# Patient Record
Sex: MALE | Race: WHITE | URBAN - METROPOLITAN AREA
[De-identification: names, ages, dates, MRNs, and addresses within clinical notes are randomized per-mention and may not be internally consistent; named-entity substitution may affect disease eponyms.]

---

## 2017-04-25 ENCOUNTER — OFFICE VISIT (OUTPATIENT)
Dept: DERMATOLOGY | Facility: CLINIC | Age: 22
End: 2017-04-25
Payer: COMMERCIAL

## 2017-04-25 DIAGNOSIS — L53.9 ERYTHRODERMA: ICD-10-CM

## 2017-04-25 DIAGNOSIS — L20.84 INTRINSIC ATOPIC DERMATITIS: Primary | ICD-10-CM

## 2017-04-25 PROCEDURE — 99214 OFFICE O/P EST MOD 30 MIN: CPT | Performed by: DERMATOLOGY

## 2017-04-25 RX ORDER — PREDNISONE 10 MG/1
TABLET ORAL
Qty: 60 TABLET | Refills: 0 | Status: SHIPPED | OUTPATIENT
Start: 2017-04-25 | End: 2018-09-26

## 2017-04-25 RX ORDER — TRIAMCINOLONE ACETONIDE 1 MG/G
OINTMENT TOPICAL
Qty: 454 G | Refills: 1 | Status: SHIPPED | OUTPATIENT
Start: 2017-04-25 | End: 2019-02-07

## 2017-04-25 NOTE — PROGRESS NOTES
[facility] Dermatology Note      Dermatology Problem List:  1. Intrinsic atopic dermatitis with flare approaching erythroderma but not as inflamed.  -Prednisone taper over 20 days from 40mg.  -Cerave + triamcinolone 0.1% ointment BID + wet wraps PM,     Encounter Date: Apr 25, 2017    CC:  Chief Complaint   Patient presents with     Derm Problem         History of Present Illness:  Mr. Joaquim Mesa is a 21 year old male who presents for evaluation of rash. He was last seen 01/06/2015 by Dr. Varghese when started on triamcinolone cream for eczema. Today, the pt reports he recently came back from [place] and the skin on his b/l elbows, neck, and legs were really dry and painful which impeded his sleep. Pt currently uses CeraVe 2-3x a day. Rash is more painful than pruritic. His worse areas are his dorsal hands and feet. The pt reports he experienced a sunburn the first day in [place]. He hasn't taken any new medication or any changes in the dose of medication. The pt is otherwise feeling well with no additional skin concerns.     Past Medical History:   Patient Active Problem List   Diagnosis     Contact dermatitis and other eczema, due to unspecified cause     Benign neoplasm of skin of trunk, except scrotum     Anal fissure     Congenital pes planus     Ingrowing nail     Patellar tendonitis     Knee pain     Family history of melanoma     Past Medical History:   Diagnosis Date     NO ACTIVE PROBLEMS      Past Surgical History:   Procedure Laterality Date     NO HISTORY OF SURGERY         Social History:  Reviewed and unchanged but kept in chart for clinician convenience  The patient is going to school for [employer] engineering.     Family History:  Reviewed and unchanged but kept in chart for clinician convenience  There is a family history of melanoma in the patient's grandparents.    Medications:  No current outpatient prescriptions on file.       Allergies   Allergen Reactions     No Known  Drug Allergies        Review of Systems:  -Constitutional: The patient is otherwise feeling well.   -Skin: As above in HPI. No additional skin concerns. No other lesions that are spontaneously bleeding, ulcerating, or not healing.    Physical exam:  Vitals: There were no vitals taken for this visit.  GEN: This is a well-nourished, well developed malein no acute distress  NEURO: Alert and oriented  PSYCH: in a pleasant mood, appropriate affect  SKIN: Total skin excluding the undergarment areas was performed. The exam included the head/face, neck, both arms, chest, back, abdomen, both legs, digits and/or nails.   -Xerosis and light pink finely scaling papules and plaques encompassing large areas of the body in an almost confluent basis without erosion or ulceration. Chest, abdomen, back, arms, legs, and face.  -B/l dorsal hands and feet with thicker somewhat lichenified red plaques.  -No other lesions of concern on areas examined.       Impression/Plan:  1. Intrinsic atopic dermatitis with flare approaching erythroderma possibly caused by sun burn or drastic changed in ambient conditions from [place] back to [place].    Prednisone taper: 40mg x 5 days, 30mg x 5 days, 20mg x 5 days, 10mg x 5 days.    Topical steroid + emollients with wetwraps at night for a week.    Cerave cream + triamcinolone 0.1% ointment BID. Deferred treating face with steroids.    Wet wraps/Wet PJ: Apply emollient and steroid, then put on damp warm long sleeve PJs and then a dry set of J's over that. Allow it to sit for at least an hour. Just do at night for a week.     Minimize soap use.      Follow-up in 2-3 weeks, earlier for new or changing lesions.       Staff Involved:  Scribe/Staff      Scribe Disclosure:   I, Kye Peres, am serving as a scribe to document services personally performed by Dr. Chriss Ram, based on data collection and the provider's statements to me.     Provider Disclosure:   I have reviewed the documentation recorded by  the scribe and have edited it as needed. I have personally performed the services documented here and the documentation accurately represents those services and the decisions made by me.     Chriss Ram MD, MS    Department of Dermatology  [facility]: Phone: [phone], Fax:[phone]  [facility]: Phone: [phone], Fax: [phone]

## 2017-04-25 NOTE — LETTER
4/25/2017       RE: Joaquim Mesa  [address] [address] [place] [id number]     Dear Colleague,    Thank you for referring your patient, Joaquim Mesa, to the [facility]. Please see a copy of my visit note below.    [facility] Dermatology Note      Dermatology Problem List:  1. Intrinsic atopic dermatitis with flare approaching erythroderma but not as inflamed.  -Prednisone taper over 20 days from 40mg.  -Cerave + triamcinolone 0.1% ointment BID + wet wraps PM,     Encounter Date: Apr 25, 2017    CC:  Chief Complaint   Patient presents with     Derm Problem         History of Present Illness:  Mr. Joaquim Mesa is a 21 year old male who presents for evaluation of rash. He was last seen 01/06/2015 by Dr. Varghese when started on triamcinolone cream for eczema. Today, the pt reports he recently came back from [place] and the skin on his b/l elbows, neck, and legs were really dry and painful which impeded his sleep. Pt currently uses CeraVe 2-3x a day. Rash is more painful than pruritic. His worse areas are his dorsal hands and feet. The pt reports he experienced a sunburn the first day in [place]. He hasn't taken any new medication or any changes in the dose of medication. The pt is otherwise feeling well with no additional skin concerns.     Past Medical History:   Patient Active Problem List   Diagnosis     Contact dermatitis and other eczema, due to unspecified cause     Benign neoplasm of skin of trunk, except scrotum     Anal fissure     Congenital pes planus     Ingrowing nail     Patellar tendonitis     Knee pain     Family history of melanoma     Past Medical History:   Diagnosis Date     NO ACTIVE PROBLEMS      Past Surgical History:   Procedure Laterality Date     NO HISTORY OF SURGERY         Social History:  Reviewed and unchanged but kept in chart for clinician convenience  The patient is going to school for  [email or web address] engineering.     Family History:  Reviewed and unchanged but kept in chart for clinician convenience  There is a family history of melanoma in the patient's grandparents.    Medications:  No current outpatient prescriptions on file.       Allergies   Allergen Reactions     No Known Drug Allergies        Review of Systems:  -Constitutional: The patient is otherwise feeling well.   -Skin: As above in HPI. No additional skin concerns. No other lesions that are spontaneously bleeding, ulcerating, or not healing.    Physical exam:  Vitals: There were no vitals taken for this visit.  GEN: This is a well-nourished, well developed malein no acute distress  NEURO: Alert and oriented  PSYCH: in a pleasant mood, appropriate affect  SKIN: Total skin excluding the undergarment areas was performed. The exam included the head/face, neck, both arms, chest, back, abdomen, both legs, digits and/or nails.   -Xerosis and light pink finely scaling papules and plaques encompassing large areas of the body in an almost confluent basis without erosion or ulceration. Chest, abdomen, back, arms, legs, and face.  -B/l dorsal hands and feet with thicker somewhat lichenified red plaques.  -No other lesions of concern on areas examined.       Impression/Plan:  1. Intrinsic atopic dermatitis with flare approaching erythroderma possibly caused by sun burn or drastic changed in ambient conditions from [place] back to [place].    Prednisone taper: 40mg x 5 days, 30mg x 5 days, 20mg x 5 days, 10mg x 5 days.    Topical steroid + emollients with wetwraps at night for a week.    Cerave cream + triamcinolone 0.1% ointment BID. Deferred treating face with steroids.    Wet wraps/Wet PJ: Apply emollient and steroid, then put on damp warm long sleeve PJs and then a dry set of J's over that. Allow it to sit for at least an hour. Just do at night for a week.     Minimize soap use.      Follow-up in 2-3 weeks, earlier for new or changing lesions.        Staff Involved:  Scribe/Staff      Scribe Disclosure:   I, Kye Peres, am serving as a scribe to document services personally performed by Dr. Chriss Ram, based on data collection and the provider's statements to me.     Provider Disclosure:   I have reviewed the documentation recorded by the scribe and have edited it as needed. I have personally performed the services documented here and the documentation accurately represents those services and the decisions made by me.     Chriss Ram MD, MS    Department of Dermatology  [facility]: Phone: [phone], Fax:[phone]  [facility]: Phone: [phone], Fax: [phone]

## 2017-04-25 NOTE — LETTER
4/25/2017      RE: Joaquim Mesa  [address] [address] [place] [id number]       [facility] Dermatology Note      Dermatology Problem List:  1. Intrinsic atopic dermatitis with flare approaching erythroderma but not as inflamed.  -Prednisone taper over 20 days from 40mg.  -Cerave + triamcinolone 0.1% ointment BID + wet wraps PM,     Encounter Date: Apr 25, 2017    CC:  Chief Complaint   Patient presents with     Derm Problem         History of Present Illness:  Mr. Joaquim Mesa is a 21 year old male who presents for evaluation of rash. He was last seen 01/06/2015 by Dr. Varghese when started on triamcinolone cream for eczema. Today, the pt reports he recently came back from [place] and the skin on his b/l elbows, neck, and legs were really dry and painful which impeded his sleep. Pt currently uses CeraVe 2-3x a day. Rash is more painful than pruritic. His worse areas are his dorsal hands and feet. The pt reports he experienced a sunburn the first day in [place]. He hasn't taken any new medication or any changes in the dose of medication. The pt is otherwise feeling well with no additional skin concerns.     Past Medical History:   Patient Active Problem List   Diagnosis     Contact dermatitis and other eczema, due to unspecified cause     Benign neoplasm of skin of trunk, except scrotum     Anal fissure     Congenital pes planus     Ingrowing nail     Patellar tendonitis     Knee pain     Family history of melanoma     Past Medical History:   Diagnosis Date     NO ACTIVE PROBLEMS      Past Surgical History:   Procedure Laterality Date     NO HISTORY OF SURGERY         Social History:  Reviewed and unchanged but kept in chart for clinician convenience  The patient is going to school for [employer] engineering.     Family History:  Reviewed and unchanged but kept in chart for clinician convenience  There is a family history of melanoma in the patient's grandparents.    Medications:  No current  outpatient prescriptions on file.       Allergies   Allergen Reactions     No Known Drug Allergies        Review of Systems:  -Constitutional: The patient is otherwise feeling well.   -Skin: As above in HPI. No additional skin concerns. No other lesions that are spontaneously bleeding, ulcerating, or not healing.    Physical exam:  Vitals: There were no vitals taken for this visit.  GEN: This is a well-nourished, well developed malein no acute distress  NEURO: Alert and oriented  PSYCH: in a pleasant mood, appropriate affect  SKIN: Total skin excluding the undergarment areas was performed. The exam included the head/face, neck, both arms, chest, back, abdomen, both legs, digits and/or nails.   -Xerosis and light pink finely scaling papules and plaques encompassing large areas of the body in an almost confluent basis without erosion or ulceration. Chest, abdomen, back, arms, legs, and face.  -B/l dorsal hands and feet with thicker somewhat lichenified red plaques.  -No other lesions of concern on areas examined.       Impression/Plan:  1. Intrinsic atopic dermatitis with flare approaching erythroderma possibly caused by sun burn or drastic changed in ambient conditions from [place] back to [place].    Prednisone taper: 40mg x 5 days, 30mg x 5 days, 20mg x 5 days, 10mg x 5 days.    Topical steroid + emollients with wetwraps at night for a week.    Cerave cream + triamcinolone 0.1% ointment BID. Deferred treating face with steroids.    Wet wraps/Wet PJ: Apply emollient and steroid, then put on damp warm long sleeve PJs and then a dry set of J's over that. Allow it to sit for at least an hour. Just do at night for a week.     Minimize soap use.      Follow-up in 2-3 weeks, earlier for new or changing lesions.       Staff Involved:  Scribe/Staff      Scribe Disclosure:   Kye ZUÑIGA, am serving as a scribe to document services personally performed by Dr. Chriss Ram, based on data collection and the provider's  statements to me.     Provider Disclosure:   I have reviewed the documentation recorded by the scribe and have edited it as needed. I have personally performed the services documented here and the documentation accurately represents those services and the decisions made by me.     Chriss Ram MD, MS    Department of Dermatology  [facility]: Phone: [phone], Fax:[phone]  [facility]: Phone: [phone], Fax: [phone]

## 2017-04-25 NOTE — PATIENT INSTRUCTIONS
Treating eczema:  Systemic: Prednisone taper for 20 days.    Topical: Wet wrap procedure for next 3-5 days.  Once a day at night, after bathing, apply moisturize and topical steroid.  Then put on a set of long sleeve PJs dampened with warm water.  Then put on a set of dry PJs over that.  Sit in this for at least 1 hour. Better if longer. Just watch TV or something while doing this.  This will drive the medication in better.

## 2017-04-25 NOTE — MR AVS SNAPSHOT
After Visit Summary   4/25/2017    Joaquim Mesa    MRN: [id number]           Patient Information     Date Of Birth          1995        Visit Information        Provider Department      4/25/2017 9:45 AM Chriss Ram MD [facility]        Today's Diagnoses     Intrinsic atopic dermatitis    -  1    Erythroderma          Care Instructions    Treating eczema:  Systemic: Prednisone taper for 20 days.    Topical: Wet wrap procedure for next 3-5 days.  Once a day at night, after bathing, apply moisturize and topical steroid.  Then put on a set of long sleeve PJs dampened with warm water.  Then put on a set of dry PJs over that.  Sit in this for at least 1 hour. Better if longer. Just watch TV or something while doing this.  This will drive the medication in better.          Follow-ups after your visit        Your next 10 appointments already scheduled     May 23, 2017  9:15 AM CDT   Return Visit with Chriss Ram MD   [facility] ([facility])    [address] [place] [id number] [phone]              Who to contact     If you have questions or need follow up information about today's clinic visit or your schedule please contact [facility] directly at [phone].  Normal or non-critical lab and imaging results will be communicated to you by [employer], letter or phone within 4 business days after the clinic has received the results. If you do not hear from us within 7 days, please contact the clinic through [employer] or phone. If you have a critical or abnormal lab result, we will notify you by phone as soon as possible.  Submit refill requests through [employer] or call your pharmacy and they will forward the refill request to us. Please allow 3 business days for your refill to be completed.          Additional Information About Your Visit        [employer] Information     [employer] is an electronic gateway  that provides easy, online access to your medical records. With [employer], you can request a clinic appointment, read your test results, renew a prescription or communicate with your care team.     To sign up for [employer] visit the website at [email or web address]   You will be asked to enter the access code listed below, as well as some personal information. Please follow the directions to create your username and password.     Your access code is: [email or web address]  Expires: 2017 10:14 AM     Your access code will  in 90 days. If you need help or a new code, please contact your [facility] or call [phone] for assistance.        Care EveryWhere ID     This is your Care EveryWhere ID. This could be used by other organizations to access your [facility] medical records  [id number]         Blood Pressure from Last 3 Encounters:   16 120/80   16 138/76   16 120/70    Weight from Last 3 Encounters:   06/15/16 101.9 kg (224 lb 11.2 oz)   16 100.7 kg (222 lb)   16 99.8 kg (220 lb)              Today, you had the following     No orders found for display         Today's Medication Changes          These changes are accurate as of: 17 10:14 AM.  If you have any questions, ask your nurse or doctor.               Start taking these medicines.        Dose/Directions    predniSONE 10 MG tablet   Commonly known as:  DELTASONE   Used for:  Erythroderma, Intrinsic atopic dermatitis        Take 4 pills daily x 5 days, 3 pills daily x 5 days, 2 pills daily x 5 days, 1 pill daily x 5 days, then stop.   Quantity:  60 tablet   Refills:  0       triamcinolone 0.1 % ointment   Commonly known as:  KENALOG   Used for:  Erythroderma, Intrinsic atopic dermatitis        Apply to all affected area of the body once or twice a day with wet wraps at night.   Quantity:  454 g   Refills:  1            Where to get your medicines      These medications [facility] to [facility]  [facility] - [place] - [address]  [address], [place] [address]     Phone:  [phone]     predniSONE 10 MG tablet    triamcinolone 0.1 % ointment                Primary Care Provider Office Phone # Fax #    Mark Rowley [phone]       [facility] [address] [place] [address]        Thank you!     Thank you for choosing [facility]  for your care. Our goal is always to provide you with excellent care. Hearing back from our patients is one way we can continue to improve our services. Please take a few minutes to complete the written survey that you may receive in the mail after your visit with us. Thank you!             Your Updated Medication List - Protect others around you: Learn how to safely use, store and throw away your medicines at www.disposemymeds.org.          This list is accurate as of: 4/25/17 10:14 AM.  Always use your most recent med list.                   Brand Name Dispense Instructions for use    predniSONE 10 MG tablet    DELTASONE    60 tablet    Take 4 pills daily x 5 days, 3 pills daily x 5 days, 2 pills daily x 5 days, 1 pill daily x 5 days, then stop.       triamcinolone 0.1 % ointment    KENALOG    454 g    Apply to all affected area of the body once or twice a day with wet wraps at night.

## 2017-04-25 NOTE — NURSING NOTE
Dermatology Rooming Note    Jaoquim Mesa's goals for this visit include:   Chief Complaint   Patient presents with     Derm Problem       Is a scribe okay for this visit:YES,     Are records needed for this visit(If yes, obtain release of information): No,      Vitals: There were no vitals taken for this visit.    Referring Provider:  Soumya Varghese MD  [facility] DERMATOLOGY  [address] [place] [address] [address]  [place] [address]

## 2017-05-23 ENCOUNTER — OFFICE VISIT (OUTPATIENT)
Dept: DERMATOLOGY | Facility: CLINIC | Age: 22
End: 2017-05-23
Payer: COMMERCIAL

## 2017-05-23 DIAGNOSIS — L20.84 INTRINSIC ATOPIC DERMATITIS: Primary | ICD-10-CM

## 2017-05-23 PROCEDURE — 99213 OFFICE O/P EST LOW 20 MIN: CPT | Performed by: DERMATOLOGY

## 2017-05-23 NOTE — NURSING NOTE
Dermatology Rooming Note    Joaquim Mesa's goals for this visit include:   Chief Complaint   Patient presents with     RECHECK       Is a scribe okay for this visit:YES,     Are records needed for this visit(If yes, obtain release of information): No,      Vitals: There were no vitals taken for this visit.    Referring Provider:  No referring provider defined for this encounter.

## 2017-05-23 NOTE — PROGRESS NOTES
[facility] Dermatology Note      Dermatology Problem List:  1. Intrinsic atopic dermatitis with flare 2/2 sunburn vs change in weather with traveling. Resolved. Taper down triamcinolone and continue emollients.  -Prednisone taper over 20 days from 40mg.  -Cerave + triamcinolone 0.1% ointment BID + wet wraps PM,     Encounter Date: May 23, 2017    CC:  Chief Complaint   Patient presents with     RECHECK         History of Present Illness:  Mr. Joaquim Mesa is a 21 year old male who presents for follow-up of atopic dermatitis with flare approaching erythroderma. He was started on prednisone taper, triamcinolone 0.1% BID and CeRave lotion. He has been done with the prednisone for about a week. He tried wet wraps for about 4 days and he couldn't tolerate it anymore and his rash got better so he decided to stop.    He is still using Triamcinolone and cerave daily. He says his rash is now undercontrol and he has no other issues.    Past Medical History:   Patient Active Problem List   Diagnosis     Contact dermatitis and other eczema, due to unspecified cause     Benign neoplasm of skin of trunk, except scrotum     Anal fissure     Congenital pes planus     Ingrowing nail     Patellar tendonitis     Knee pain     Family history of melanoma     Past Medical History:   Diagnosis Date     NO ACTIVE PROBLEMS      Past Surgical History:   Procedure Laterality Date     NO HISTORY OF SURGERY         Social History:  Reviewed and unchanged but kept in chart for clinician convenience  The patient is going to school for [employer].     Family History:  Reviewed and unchanged but kept in chart for clinician convenience  There is a family history of melanoma in the patient's grandparents.    Medications:  Current Outpatient Prescriptions   Medication Sig Dispense Refill     predniSONE (DELTASONE) 10 MG tablet Take 4 pills daily x 5 days, 3 pills daily x 5 days, 2 pills daily x 5 days, 1 pill daily x  5 days, then stop. (Patient not taking: Reported on 5/23/2017) 60 tablet 0     triamcinolone (KENALOG) 0.1 % ointment Apply to all affected area of the body once or twice a day with wet wraps at night. 454 g 1       Allergies   Allergen Reactions     No Known Drug Allergies        Review of Systems:  -Constitutional: The patient is otherwise feeling well.   -Skin: As above in HPI. No additional skin concerns.    Physical exam:  Vitals: There were no vitals taken for this visit.  GEN: This is a well-nourished, well developed malein no acute distress  NEURO: Alert and oriented  PSYCH: in a pleasant mood, appropriate affect  SKIN: Waist-up skin, which includes the head/face, neck, both arms, chest, back, abdomen, digits and/or nails was examined.  -Site of prior eczematous rash on the trunk and extremities has resolved with minor erythema residual from a sunburn  -There is minimal dryness on the cheeks.  -No other lesions of concern on areas examined.       Impression/Plan:  1. Intrinsic atopic dermatitis with flare: resolved    Completed prednisone taper. No refills.    Taper triamcinolone 0.1% ointment every other day for a week, three times per week for 1 week and then discontinue.    Minimize soap use. Recommended a mild soap like Vanicream, Cetaphil, etc.    Continue using emollients.     Please step up treatment with prn wet wraps if flared again.      Follow-up as needed for new or changing lesions per pt preference. I recommended 6 month f/u.      Staff Involved:  Scribe/Staff      Scribe Disclosure:   I, Kody Mesa, am serving as a scribe to document services personally performed by Dr. Chriss Ram, based on data collection and the provider's statements to me.     Provider Disclosure:   I have reviewed the documentation recorded by the scribe and have edited it as needed. I have personally performed the services documented here and the documentation accurately represents those services and the decisions made  by me.     Chriss Ram MD, MS    Department of Dermatology  [facility]: Phone: [phone], Fax:[phone]  [facility]: Phone: [phone], Fax: [phone]

## 2017-05-23 NOTE — PATIENT INSTRUCTIONS
Decrease steroid application to every other day for a week, then 3 times a week, then stop.    You can also step up treatments with flares with twice a day application for 2 weeks then decrease down. Also wet wraps are a wet to deal with flares and can be done with just a moisturizer.    Dove Sensitive bar soap. Vanicream bar soap, cetaphil bar soap, cerave bar soap or designers imposters. Or soapless cleanser.

## 2017-05-23 NOTE — MR AVS SNAPSHOT
After Visit Summary   5/23/2017    Joaquim Mesa    MRN: [id number]           Patient Information     Date Of Birth          1995        Visit Information        Provider Department      5/23/2017 9:15 AM Chriss Ram MD [facility]        Today's Diagnoses     Intrinsic atopic dermatitis    -  1      Care Instructions    Decrease steroid application to every other day for a week, then 3 times a week, then stop.    You can also step up treatments with flares with twice a day application for 2 weeks then decrease down. Also wet wraps are a wet to deal with flares and can be done with just a moisturizer.    Dove Sensitive bar soap. Vanicream bar soap, cetaphil bar soap, cerave bar soap or designers imposters. Or soapless cleanser.        Follow-ups after your visit        Who to contact     If you have questions or need follow up information about today's clinic visit or your schedule please contact [facility] directly at [phone].  Normal or non-critical lab and imaging results will be communicated to you by [employer], letter or phone within 4 business days after the clinic has received the results. If you do not hear from us within 7 days, please contact the clinic through [employer] or phone. If you have a critical or abnormal lab result, we will notify you by phone as soon as possible.  Submit refill requests through [employer] or call your pharmacy and they will forward the refill request to us. Please allow 3 business days for your refill to be completed.          Additional Information About Your Visit        [employer] Information     [employer] is an electronic gateway that provides easy, online access to your medical records. With [employer], you can request a clinic appointment, read your test results, renew a prescription or communicate with your care team.     To sign up for [employer] visit the website at [email or web address]   You will be asked to  enter the access code listed below, as well as some personal information. Please follow the directions to create your username and password.     Your access code is: [email or web address]  Expires: 2017 10:14 AM     Your access code will  in 90 days. If you need help or a new code, please contact your [facility] or call [phone] for assistance.        Care EveryWhere ID     This is your Care EveryWhere ID. This could be used by other organizations to access your [place] medical records  [id number]         Blood Pressure from Last 3 Encounters:   16 120/80   16 138/76   16 120/70    Weight from Last 3 Encounters:   06/15/16 224 lb 11.2 oz (101.9 kg)   16 222 lb (100.7 kg)   16 220 lb (99.8 kg)              Today, you had the following     No orders found for display       Primary Care Provider Office Phone # Fax #    Mark Rowley [phone]       [facility] [address] [place] [address]        Thank you!     Thank you for choosing [facility]  for your care. Our goal is always to provide you with excellent care. Hearing back from our patients is one way we can continue to improve our services. Please take a few minutes to complete the written survey that you may receive in the mail after your visit with us. Thank you!             Your Updated Medication List - Protect others around you: Learn how to safely use, store and throw away your medicines at www.disposemymeds.org.          This list is accurate as of: 17  9:49 AM.  Always use your most recent med list.                   Brand Name Dispense Instructions for use    predniSONE 10 MG tablet    DELTASONE    60 tablet    Take 4 pills daily x 5 days, 3 pills daily x 5 days, 2 pills daily x 5 days, 1 pill daily x 5 days, then stop.       triamcinolone 0.1 % ointment    KENALOG    454 g    Apply to all affected area of the body  once or twice a day with wet wraps at night.

## 2018-09-26 ENCOUNTER — OFFICE VISIT (OUTPATIENT)
Dept: URGENT CARE | Facility: RETAIL CLINIC | Age: 23
End: 2018-09-26
Payer: COMMERCIAL

## 2018-09-26 VITALS
HEART RATE: 75 BPM | DIASTOLIC BLOOD PRESSURE: 79 MMHG | OXYGEN SATURATION: 97 % | TEMPERATURE: 97.2 F | SYSTOLIC BLOOD PRESSURE: 148 MMHG

## 2018-09-26 DIAGNOSIS — H92.02 EAR DISCOMFORT, LEFT: ICD-10-CM

## 2018-09-26 DIAGNOSIS — R03.0 ELEVATED BLOOD PRESSURE READING WITHOUT DIAGNOSIS OF HYPERTENSION: ICD-10-CM

## 2018-09-26 DIAGNOSIS — H61.22 IMPACTED CERUMEN OF LEFT EAR: Primary | ICD-10-CM

## 2018-09-26 PROCEDURE — 99202 OFFICE O/P NEW SF 15 MIN: CPT | Mod: 25 | Performed by: PHYSICIAN ASSISTANT

## 2018-09-26 PROCEDURE — 69209 REMOVE IMPACTED EAR WAX UNI: CPT | Mod: LT | Performed by: PHYSICIAN ASSISTANT

## 2018-09-26 NOTE — PATIENT INSTRUCTIONS
Your blood pressure is elevated at today's visit.  Please check your BP twice in the next week or so.  You should follow up with your primary provider regarding possible hypertension if your rechecks are greater than 140/90.  You can check your BP at local pharmacies, grocery stores or with the float nurse at the [facility]. Record your readings and take them with you to your appointment.  Goal BP <140/90 (new < 130/80)  Do not take decongestants - they can raise your BP.  If you have chest pain, unusual headaches, vision changes or any sign or symptoms of stroke seek prompt medical attention.    /79 (BP Location: Right arm, Patient Position: Sitting, Cuff Size: Adult Large)  Pulse 75  Temp 97.2  F (36.2  C) (Temporal)  SpO2 97%      Please FOLLOW UP at primary care clinic if not improving, new symptoms, worse or this does not resolve.  [facility]  [phone]

## 2018-09-26 NOTE — MR AVS SNAPSHOT
After Visit Summary   9/26/2018    Joaquim Mesa    MRN: [id number]           Patient Information     Date Of Birth          1995        Visit Information        Provider Department      9/26/2018 5:00 PM Ludivina Babb PA-C [facility]        Today's Diagnoses     Ear discomfort, left    -  1    Elevated blood pressure reading without diagnosis of hypertension        Impacted cerumen of left ear          Care Instructions    Your blood pressure is elevated at today's visit.  Please check your BP twice in the next week or so.  You should follow up with your primary provider regarding possible hypertension if your rechecks are greater than 140/90.  You can check your BP at local pharmacies, grocery stores or with the float nurse at the [facility]. Record your readings and take them with you to your appointment.  Goal BP <140/90 (new < 130/80)  Do not take decongestants - they can raise your BP.  If you have chest pain, unusual headaches, vision changes or any sign or symptoms of stroke seek prompt medical attention.    /79 (BP Location: Right arm, Patient Position: Sitting, Cuff Size: Adult Large)  Pulse 75  Temp 97.2  F (36.2  C) (Temporal)  SpO2 97%      Please FOLLOW UP at primary care clinic if not improving, new symptoms, worse or this does not resolve.  [facility]  [phone]            Follow-ups after your visit        Your next 10 appointments already scheduled     Jan 31, 2019  8:45 AM CST   Return Visit with Soumya Varghese MD   [facility]    [address] [place] [id number] [phone]              Who to contact     You can reach your care team any time of the day by calling [phone].  Notification of test results:  If you have an abnormal lab result, we will notify you by phone as soon as possible.         Additional Information About Your Visit       "  MyChart Information     [employer] lets you send messages to your doctor, view your test results, renew your prescriptions, schedule appointments and more. To sign up, go to [email or web address] . Click on \"Log in\" on the left side of the screen, which will take you to the Welcome page. Then click on \"Sign up Now\" on the right side of the page.     You will be asked to enter the access code listed below, as well as some personal information. Please follow the directions to create your username and password.     Your access code is: [email or web address]  Expires: 2018  5:30 PM     Your access code will  in 90 days. If you need help or a new code, please call your [place] clinic or [phone].        Care EveryWhere ID     This is your Care EveryWhere ID. This could be used by other organizations to access your [place] medical records  [id number]        Your Vitals Were     Pulse Temperature Pulse Oximetry             75 97.2  F (36.2  C) (Temporal) 97%          Blood Pressure from Last 3 Encounters:   18 148/79   16 120/80   16 138/76    Weight from Last 3 Encounters:   06/15/16 224 lb 11.2 oz (101.9 kg)   16 222 lb (100.7 kg)   16 220 lb (99.8 kg)              We Performed the Following     HC REMOVAL IMPACTED CERUMEN IRRIGATION/LVG UNILAT        Primary Care Provider Office Phone # Fax #    Mark Rowley [phone]       [address] [address]        Equal Access to Services     [facility] : Hadii bill Edmonds, wapedro miramontes, qaybta eugeniealwilliam whalen. So [facility] [phone].    ATENCIÓN: Si habla español, tiene a kang disposición servicios gratuitos de asistencia lingüística. Carlie al [phone].    We comply with applicable federal civil rights laws and [place] laws. We do not discriminate on the basis of race, color, national origin, age, disability, sex, sexual " orientation, or gender identity.            Thank you!     Thank you for choosing [facility]  for your care. Our goal is always to provide you with excellent care. Hearing back from our patients is one way we can continue to improve our services. Please take a few minutes to complete the written survey that you may receive in the mail after your visit with us. Thank you!             Your Updated Medication List - Protect others around you: Learn how to safely use, store and throw away your medicines at www.disposemymeds.org.          This list is accurate as of 9/26/18  5:31 PM.  Always use your most recent med list.                   Brand Name Dispense Instructions for use Diagnosis    triamcinolone 0.1 % ointment    KENALOG    454 g    Apply to all affected area of the body once or twice a day with wet wraps at night.    Erythroderma, Intrinsic atopic dermatitis

## 2018-09-26 NOTE — PROGRESS NOTES
S: Pt presents to [facility] in [place] with Plugged sensation left ear x 2 days.  No URI symptoms .  No fever .  No ear pain .  Hx of cerumen impaction - no    ROS:  ENT - denies ear pain, throat pain. No nasal congestion.  Had a head cold last week but symptoms cleared)  CP - no cough,SOB or chest pain.   GI/ - Appetite - ok. No nausea, vomiting or diarrhea.   No bowel or bladder changes   MSK - no joint pain or swelling.   Skin-  No rashes. No lesions.    Past Medical History:   Diagnosis Date     NO ACTIVE PROBLEMS      Past Surgical History:   Procedure Laterality Date     NO HISTORY OF SURGERY       Patient Active Problem List   Diagnosis     Contact dermatitis and other eczema, due to unspecified cause     Benign neoplasm of skin of trunk, except scrotum     Anal fissure     Congenital pes planus     Ingrowing nail     Patellar tendonitis     Knee pain     Family history of melanoma     Current Outpatient Prescriptions   Medication     triamcinolone (KENALOG) 0.1 % ointment     No current facility-administered medications for this visit.          O: /79 (BP Location: Right arm, Patient Position: Sitting, Cuff Size: Adult Large)  Pulse 75  Temp 97.2  F (36.2  C) (Temporal)  SpO2 97%    Rt canal clear and TM appears normal.  Lt canal filled with cerumen . Successful lavage by HEATHER CASANOVA, and TM appears normal   N,T,Neck - unremarkable    A;    Ear discomfort, left  Elevated blood pressure reading without diagnosis of hypertension  Impacted cerumen of left ear      P: No Qtips  Ear hygiene discussed.  Followup with PCP if not improving and anytime if worse.  Monitor BP.      AVS given and discussed:  Patient Instructions   Your blood pressure is elevated at today's visit.  Please check your BP twice in the next week or so.  You should follow up with your primary provider regarding possible hypertension if your rechecks are greater than 140/90.  You can check your BP at local pharmacies,  grocery stores or with the float nurse at the [facility]. Record your readings and take them with you to your appointment.  Goal BP <140/90 (new < 130/80)  Do not take decongestants - they can raise your BP.  If you have chest pain, unusual headaches, vision changes or any sign or symptoms of stroke seek prompt medical attention.    /79 (BP Location: Right arm, Patient Position: Sitting, Cuff Size: Adult Large)  Pulse 75  Temp 97.2  F (36.2  C) (Temporal)  SpO2 97%      Please FOLLOW UP at primary care clinic if not improving, new symptoms, worse or this does not resolve.  [facility]  [phone]      Pt is comfortable with this plan.  Electronically signed,  JENNIFER Babb, PAC

## 2019-02-07 ENCOUNTER — OFFICE VISIT (OUTPATIENT)
Dept: DERMATOLOGY | Facility: CLINIC | Age: 24
End: 2019-02-07
Payer: COMMERCIAL

## 2019-02-07 DIAGNOSIS — L30.9 DERMATITIS: Primary | ICD-10-CM

## 2019-02-07 DIAGNOSIS — D22.9 MULTIPLE BENIGN NEVI: ICD-10-CM

## 2019-02-07 PROCEDURE — 99213 OFFICE O/P EST LOW 20 MIN: CPT | Performed by: DERMATOLOGY

## 2019-02-07 RX ORDER — TRIAMCINOLONE ACETONIDE 1 MG/G
OINTMENT TOPICAL
Qty: 454 G | Refills: 2 | Status: SHIPPED | OUTPATIENT
Start: 2019-02-07

## 2019-02-07 RX ORDER — TRIAMCINOLONE ACETONIDE 1 MG/G
OINTMENT TOPICAL
Qty: 454 G | Refills: 1 | Status: SHIPPED | OUTPATIENT
Start: 2019-02-07 | End: 2019-02-07

## 2019-02-07 ASSESSMENT — PAIN SCALES - GENERAL: PAINLEVEL: MILD PAIN (2)

## 2019-02-07 NOTE — PROGRESS NOTES
[facility] Dermatology Note      Dermatology Problem List:  1. Intrinsic atopic dermatitis with flare 2/2 sunburn vs change in weather with traveling. Resolved. Taper down triamcinolone and continue emollients.  -Prednisone taper over 20 days from 40mg.  -Cerave + triamcinolone 0.1% ointment BID + wet wraps PM,     Encounter Date: Feb 7, 2019    CC:  Chief Complaint   Patient presents with     Derm Problem     Eczema on legs and back.  Only using CeraVe moisturizer 1-2 times daily.         History of Present Illness:  Mr. Joaquim Mesa is a 23 year old male who presents for evaluation of eczema. Of note, the patient had a flare of dermatitis in 2017 that resolved with a course of prednisone and TAC 0.1% ointment. The patient reports a recurrence of the eczema on his legs and back. He states that the flare developed as the cold weather started. He has been controlling his eczema with OTC moisturizers. He is interested in restarting TAC ointment. The patient voices no other concerns.     Past Medical History:   Patient Active Problem List   Diagnosis     Contact dermatitis and other eczema, due to unspecified cause     Benign neoplasm of skin of trunk, except scrotum     Anal fissure     Congenital pes planus     Ingrowing nail     Patellar tendonitis     Knee pain     Family history of melanoma     Past Medical History:   Diagnosis Date     NO ACTIVE PROBLEMS      Past Surgical History:   Procedure Laterality Date     NO HISTORY OF SURGERY         Social History:  Patient reports that  has never smoked. he has never used smokeless tobacco. He reports that he does not drink alcohol or use drugs.    Family History:  History reviewed. No pertinent family history.    Medications:  No current outpatient medications on file.       Allergies   Allergen Reactions     No Known Drug Allergies        Review of Systems:  -Constitutional: Otherwise feeling well today, in usual state of health.  -Skin: As  above in HPI. No additional skin concerns.    Physical exam:  Vitals: There were no vitals taken for this visit.  GEN: This is a well developed, well-nourished male in no acute distress, in a pleasant mood.    SKIN: Focused examination of the legs, back, buttocks, and hands was performed. The patient declines a full body exam.  -2 pigmented symmetric macules on the R cheek   -severe xerosis with erythema and scaling on the lower legs  -eczematous patches on the lower back  -No other lesions of concern on areas examined.       Impression/Plan:  1. Eczematous dermatitis, flare on lower legs    Restart triamcinolone 0.1% ointment     Reviewed risks of skin thinning.    Advised the use of Vaseline under occlusion with saran wrap    Advised the used of OTC creams instead of lotions     Dove sensitive skin soap    Call clinic if not improving    2. Multiple clinically benign nevi on the R cheek    No further intervention required. Patient to report changes.     Follow-up in 10 months, earlier for new or changing lesions.       Staff Involved:  Scribe/Staff    Scribe Disclosure  I, Dominic Najjar, am serving as a scribe to document services personally performed by Dr. Soumya Varghese MD, based on data collection and the provider's statements to me.      Provider Disclosure:   The documentation recorded by the scribe accurately reflects the services I personally performed and the decisions made by me.    Soumya Varghese MD    Department of Dermatology  [facility]: Phone: [phone], Fax:[phone]  [facility]: Phone: [phone], Fax: [phone]

## 2019-02-07 NOTE — NURSING NOTE
Joaquim Mesa's goals for this visit include:   Chief Complaint   Patient presents with     Derm Problem     Eczema on legs and back.  Only using CeraVe moisturizer 1-2 times daily.       He requests these members of his care team be copied on today's visit information: n/a    PCP: Mark Rowley    Referring Provider:  No referring provider defined for this encounter.    Antoinette Ballesteros RN

## 2019-02-07 NOTE — LETTER
2/7/2019         RE: Joaquim Mesa  [id number] [address] [place] [id number]        Dear Colleague,    Thank you for referring your patient, Joaquim Mesa, to the [facility]. Please see a copy of my visit note below.    [facility] Dermatology Note      Dermatology Problem List:  1. Intrinsic atopic dermatitis with flare 2/2 sunburn vs change in weather with traveling. Resolved. Taper down triamcinolone and continue emollients.  -Prednisone taper over 20 days from 40mg.  -Cerave + triamcinolone 0.1% ointment BID + wet wraps PM,     Encounter Date: Feb 7, 2019    CC:  Chief Complaint   Patient presents with     Derm Problem     Eczema on legs and back.  Only using CeraVe moisturizer 1-2 times daily.         History of Present Illness:  Mr. Joaquim Mesa is a 23 year old male who presents for evaluation of eczema. Of note, the patient had a flare of dermatitis in 2017 that resolved with a course of prednisone and TAC 0.1% ointment. The patient reports a recurrence of the eczema on his legs and back. He states that the flare developed as the cold weather started. He has been controlling his eczema with OTC moisturizers. He is interested in restarting TAC ointment. The patient voices no other concerns.     Past Medical History:   Patient Active Problem List   Diagnosis     Contact dermatitis and other eczema, due to unspecified cause     Benign neoplasm of skin of trunk, except scrotum     Anal fissure     Congenital pes planus     Ingrowing nail     Patellar tendonitis     Knee pain     Family history of melanoma     Past Medical History:   Diagnosis Date     NO ACTIVE PROBLEMS      Past Surgical History:   Procedure Laterality Date     NO HISTORY OF SURGERY         Social History:  Patient reports that  has never smoked. he has never used smokeless tobacco. He reports that he does not drink alcohol or use drugs.    Family History:  History reviewed. No pertinent  family history.    Medications:  No current outpatient medications on file.       Allergies   Allergen Reactions     No Known Drug Allergies        Review of Systems:  -Constitutional: Otherwise feeling well today, in usual state of health.  -Skin: As above in HPI. No additional skin concerns.    Physical exam:  Vitals: There were no vitals taken for this visit.  GEN: This is a well developed, well-nourished male in no acute distress, in a pleasant mood.    SKIN: Focused examination of the legs, back, buttocks, and hands was performed. The patient declines a full body exam.  -2 pigmented symmetric macules on the R cheek   -severe xerosis with erythema and scaling on the lower legs  -eczematous patches on the lower back  -No other lesions of concern on areas examined.       Impression/Plan:  1. Eczematous dermatitis, flare on lower legs    Restart triamcinolone 0.1% ointment     Reviewed risks of skin thinning.    Advised the use of Vaseline under occlusion with saran wrap    Advised the used of OTC creams instead of lotions     Dove sensitive skin soap    Call clinic if not improving    2. Multiple clinically benign nevi on the R cheek    No further intervention required. Patient to report changes.     Follow-up in 10 months, earlier for new or changing lesions.       Staff Involved:  Scribe/Staff    Scribe Disclosure  I, Dominic Najjar, am serving as a scribe to document services personally performed by Dr. Soumya Varghese MD, based on data collection and the provider's statements to me.      Provider Disclosure:   The documentation recorded by the scribe accurately reflects the services I personally performed and the decisions made by me.    Soumya Varghese MD    Department of Dermatology  [facility]: Phone: [phone], Fax:[phone]  [facility]: Phone: [phone], Fax:  [phone]        Again, thank you for allowing me to participate in the care of your patient.        Sincerely,        Soumya Varghese MD